# Patient Record
Sex: MALE | URBAN - METROPOLITAN AREA
[De-identification: names, ages, dates, MRNs, and addresses within clinical notes are randomized per-mention and may not be internally consistent; named-entity substitution may affect disease eponyms.]

---

## 2020-02-11 ENCOUNTER — APPOINTMENT (OUTPATIENT)
Dept: URBAN - METROPOLITAN AREA CLINIC 237 | Age: 35
Setting detail: DERMATOLOGY
End: 2020-02-11

## 2020-02-11 DIAGNOSIS — L42 PITYRIASIS ROSEA: ICD-10-CM

## 2020-02-11 PROBLEM — L30.9 DERMATITIS, UNSPECIFIED: Status: ACTIVE | Noted: 2020-02-11

## 2020-02-11 PROCEDURE — 99202 OFFICE O/P NEW SF 15 MIN: CPT | Mod: 25

## 2020-02-11 PROCEDURE — OTHER SEPARATE AND IDENTIFIABLE DOCUMENTATION: OTHER

## 2020-02-11 PROCEDURE — OTHER PATIENT SPECIFIC COUNSELING: OTHER

## 2020-02-11 PROCEDURE — 11104 PUNCH BX SKIN SINGLE LESION: CPT

## 2020-02-11 PROCEDURE — OTHER PRESCRIPTION: OTHER

## 2020-02-11 PROCEDURE — OTHER TREATMENT REGIMEN: OTHER

## 2020-02-11 PROCEDURE — OTHER COUNSELING: OTHER

## 2020-02-11 PROCEDURE — OTHER BIOPSY BY PUNCH METHOD: OTHER

## 2020-02-11 PROCEDURE — OTHER MIPS QUALITY: OTHER

## 2020-02-11 RX ORDER — TRIAMCINOLONE ACETONIDE 1 MG/G
SMALL AMOUNT OINTMENT TOPICAL BID PRN
Qty: 1 | Refills: 0 | Status: ERX | COMMUNITY
Start: 2020-02-11

## 2020-02-11 ASSESSMENT — LOCATION DETAILED DESCRIPTION DERM
LOCATION DETAILED: INFERIOR THORACIC SPINE
LOCATION DETAILED: RIGHT SUPERIOR MEDIAL FOREHEAD
LOCATION DETAILED: SUPRAPUBIC SKIN
LOCATION DETAILED: RIGHT CLAVICULAR NECK
LOCATION DETAILED: PERIUMBILICAL SKIN
LOCATION DETAILED: LEFT RIB CAGE
LOCATION DETAILED: LEFT ANTERIOR SHOULDER

## 2020-02-11 ASSESSMENT — LOCATION SIMPLE DESCRIPTION DERM
LOCATION SIMPLE: RIGHT ANTERIOR NECK
LOCATION SIMPLE: UPPER BACK
LOCATION SIMPLE: RIGHT FOREHEAD
LOCATION SIMPLE: LEFT SHOULDER
LOCATION SIMPLE: GROIN
LOCATION SIMPLE: ABDOMEN

## 2020-02-11 ASSESSMENT — LOCATION ZONE DERM
LOCATION ZONE: FACE
LOCATION ZONE: NECK
LOCATION ZONE: TRUNK
LOCATION ZONE: ARM

## 2020-02-11 NOTE — PROCEDURE: PATIENT SPECIFIC COUNSELING
Discussed differential including UT. Patient denies new sexual contacts. Groin and scrotal involvement, but not involving penis. No palmoplantar involvement.
Detail Level: Zone

## 2020-02-11 NOTE — PROCEDURE: BIOPSY BY PUNCH METHOD
Biopsy Type: H and E
Epidermal Sutures: 5-0 Ethilon
Render In Bullet Format When Appropriate: No
Was A Bandage Applied: Yes
Dressing: bandage
Suture Removal: 14 days
X Depth Of Punch In Cm (Optional): 0
Anesthesia Type: 1% lidocaine with epinephrine
Anesthesia Volume In Cc (Will Not Render If 0): 0.1
Punch Size In Mm: 4
Consent: Written consent was obtained and risks were reviewed including but not limited to scarring, infection, bleeding, scabbing, incomplete removal, nerve damage and allergy to anesthesia.
Hemostasis: None
Post-Care Instructions: I reviewed with the patient in detail post-care instructions. Patient is to keep the biopsy site dry overnight, and then apply bacitracin twice daily until healed. Patient may apply hydrogen peroxide soaks to remove any crusting.
Notification Instructions: Patient will be notified of biopsy results. However, patient instructed to call the office if not contacted within 2 weeks.
Detail Level: Detailed
Billing Type: Third-Party Bill
Home Suture Removal Text: Patient was provided a home suture removal kit and will remove their sutures at home.  If they have any questions or difficulties they will call the office.
Wound Care: Petrolatum

## 2020-02-20 ENCOUNTER — RX ONLY (RX ONLY)
Age: 35
End: 2020-02-20

## 2020-02-20 RX ORDER — TRIAMCINOLONE ACETONIDE 1 MG/G
SMALL AMOUNT OINTMENT TOPICAL BID PRN
Qty: 1 | Refills: 0 | Status: ERX

## 2020-02-25 ENCOUNTER — APPOINTMENT (OUTPATIENT)
Dept: URBAN - METROPOLITAN AREA CLINIC 237 | Age: 35
Setting detail: DERMATOLOGY
End: 2020-02-25

## 2020-02-25 DIAGNOSIS — L42 PITYRIASIS ROSEA: ICD-10-CM

## 2020-02-25 PROCEDURE — OTHER SUTURE REMOVAL (NO GLOBAL PERIOD): OTHER

## 2020-02-25 PROCEDURE — OTHER ADDITIONAL NOTES: OTHER

## 2020-02-25 PROCEDURE — OTHER TREATMENT REGIMEN: OTHER

## 2020-02-25 PROCEDURE — OTHER MIPS QUALITY: OTHER

## 2020-02-25 PROCEDURE — OTHER DIAGNOSIS COMMENT: OTHER

## 2020-02-25 PROCEDURE — OTHER COUNSELING: OTHER

## 2020-02-25 PROCEDURE — 99213 OFFICE O/P EST LOW 20 MIN: CPT

## 2020-02-25 ASSESSMENT — LOCATION DETAILED DESCRIPTION DERM
LOCATION DETAILED: LEFT RIB CAGE
LOCATION DETAILED: EPIGASTRIC SKIN

## 2020-02-25 ASSESSMENT — LOCATION ZONE DERM: LOCATION ZONE: TRUNK

## 2020-02-25 ASSESSMENT — LOCATION SIMPLE DESCRIPTION DERM: LOCATION SIMPLE: ABDOMEN

## 2020-02-25 NOTE — PROCEDURE: ADDITIONAL NOTES
Detail Level: Simple
Additional Notes: Discussed diagnosis; discussed RPR but patient declines. Will monitor for resolution.

## 2020-03-16 ENCOUNTER — RX ONLY (RX ONLY)
Age: 35
End: 2020-03-16

## 2020-03-16 RX ORDER — TRIAMCINOLONE ACETONIDE 1 MG/G
OINTMENT TOPICAL BID PRN
Qty: 1 | Refills: 0 | Status: ERX

## 2020-05-04 ENCOUNTER — APPOINTMENT (OUTPATIENT)
Age: 35
Setting detail: DERMATOLOGY
End: 2020-05-05

## 2020-05-04 DIAGNOSIS — L663 OTHER SPECIFIED DISEASES OF HAIR AND HAIR FOLLICLES: ICD-10-CM

## 2020-05-04 DIAGNOSIS — L738 OTHER SPECIFIED DISEASES OF HAIR AND HAIR FOLLICLES: ICD-10-CM

## 2020-05-04 PROBLEM — L02.426 FURUNCLE OF LEFT LOWER LIMB: Status: ACTIVE | Noted: 2020-05-04

## 2020-05-04 PROBLEM — L02.423 FURUNCLE OF RIGHT UPPER LIMB: Status: ACTIVE | Noted: 2020-05-04

## 2020-05-04 PROBLEM — L02.424 FURUNCLE OF LEFT UPPER LIMB: Status: ACTIVE | Noted: 2020-05-04

## 2020-05-04 PROBLEM — L02.425 FURUNCLE OF RIGHT LOWER LIMB: Status: ACTIVE | Noted: 2020-05-04

## 2020-05-04 PROCEDURE — OTHER ADDITIONAL NOTES: OTHER

## 2020-05-04 PROCEDURE — OTHER DIAGNOSIS COMMENT: OTHER

## 2020-05-04 PROCEDURE — OTHER COUNSELING: OTHER

## 2020-05-04 PROCEDURE — OTHER PRESCRIPTION: OTHER

## 2020-05-04 PROCEDURE — 99212 OFFICE O/P EST SF 10 MIN: CPT | Mod: 95

## 2020-05-04 PROCEDURE — OTHER TREATMENT REGIMEN: OTHER

## 2020-05-04 PROCEDURE — OTHER MIPS QUALITY: OTHER

## 2020-05-04 RX ORDER — CLINDAMYCIN AND BENZOYL PEROXIDE 10; 50 MG/G; MG/G
SMALL AMOUNT GEL TOPICAL QD
Qty: 1 | Refills: 0 | Status: ERX | COMMUNITY
Start: 2020-05-04

## 2020-05-04 ASSESSMENT — LOCATION DETAILED DESCRIPTION DERM
LOCATION DETAILED: LEFT POSTERIOR SHOULDER
LOCATION DETAILED: RIGHT POSTERIOR SHOULDER
LOCATION DETAILED: RIGHT ANTERIOR PROXIMAL THIGH
LOCATION DETAILED: LEFT ANTERIOR PROXIMAL THIGH

## 2020-05-04 ASSESSMENT — LOCATION SIMPLE DESCRIPTION DERM
LOCATION SIMPLE: LEFT THIGH
LOCATION SIMPLE: LEFT SHOULDER
LOCATION SIMPLE: RIGHT THIGH
LOCATION SIMPLE: RIGHT SHOULDER

## 2020-05-04 ASSESSMENT — LOCATION ZONE DERM
LOCATION ZONE: ARM
LOCATION ZONE: LEG

## 2020-05-04 NOTE — PROCEDURE: ADDITIONAL NOTES
Additional Notes: Rash appears different than [place], more follicularly distributed papules
Detail Level: Simple

## 2020-05-04 NOTE — PROCEDURE: TREATMENT REGIMEN
Initiate Treatment: Doxy 100mg BID x 4 weeks\\nBzCl QD
Discontinue Regimen: [facility] LESA
Detail Level: Zone